# Patient Record
(demographics unavailable — no encounter records)

---

## 2025-06-18 NOTE — ASSESSMENT
[FreeTextEntry1] : Recent labs: CBC: Hb 12.0, WBC and plt wnl CMP: lytes wnl, Cr 0.8, LFTs wnl Iron studies: TIBC 300, Tsat 28, ferritin 70 TSH wnl A1c 5.4% hepatitis core ab reactive    RTC in 2 weeks for BP check and sx's check in

## 2025-06-18 NOTE — HISTORY OF PRESENT ILLNESS
[FreeTextEntry1] : Establish care. Feet  swollen  [de-identified] : 82M Yi speaking w/ PMHx HTN, BPH, and GERD who presents to establish care.   He c/o dizzy spells both with positional change and at rest. Sometimes when going from sitting to standing he feels like he is spinning. Had a mechanical fall a few nights ago when going to the bathroom, no head strike or LOC, no presyncope. Felt unsteady and tripped. Also feels very dizzy when laying down and turning his head.   Also c/o palpitations, lasting minuts and the self resolve. Usually occur at rest. No associated CP, presyncope, or dyspnea.   Reports some polyuria, with frequent nocturnal awakening. Small volume urination but no dribbling. No gross hematuria.  C/o LE sweeling, b/l persist morning to night - especially noticable when wearing socks.

## 2025-06-18 NOTE — END OF VISIT
[FreeTextEntry3] : Patient is an 82-year-old man here to establish care.  He has a history of BPH, hypertension and GERD.  He is complaining of vertigo which is positional and lightheadedness.  He also has palpitations and urinary frequency.  He urinates every hour or 2 throughout the day.  He takes tamsulosin daily.  His initial blood pressure was very close to goal upon repeat it was a bit elevated.  EKG shows sinus bradycardia with heart rate in the 40s and sinus arrhythmia.  Shay-Hallpike test is negative although patient had significant dizziness with movement labs obtained outside the organization are okay including thyroid function test.  Will refer to vestibular rehab, increase tamsulosin to 0.8 mg daily, refer to cardiology for evaluation of arrhythmia and ENT for vertigo.  EKG shows significant sinus bradycardia. We will order a sphygmomanometer for the patient and have him take his blood pressure at home twice daily with the assistance of his daughter.  Will see patient back here in 2 weeks.

## 2025-07-01 NOTE — ASSESSMENT
[FreeTextEntry1] : 83 y/o man with dizziness - sometimes positional, others with palpitations. Notable that he has shown sinus rates as low as 42, raising possibility of a tachy-renu syndrome.  Await results of Isa (M)

## 2025-07-01 NOTE — PHYSICAL EXAM

## 2025-07-01 NOTE — REASON FOR VISIT
[Hypertension] : hypertension [Other: ____] : [unfilled] [Family Member] : family member [FreeTextEntry3] : Renetta Gama [FreeTextEntry1] : Patient with h/o HTN c/o dizziness. Seems to be positional, mostly when he turns his head and he is scheduled to see otology end of this month.  He also c/o palpitations described as rapid beating with no apparent triggers, but this can also cause some dizziness. NO syncope.  Non-exertional.  EKG is NSR, rate 61, left axis.  Another EKG from June shows rate 42.

## 2025-07-09 NOTE — HEALTH RISK ASSESSMENT
[Little interest or pleasure doing things] : 1) Little interest or pleasure doing things [Feeling down, depressed, or hopeless] : 2) Feeling down, depressed, or hopeless [0] : 2) Feeling down, depressed, or hopeless: Not at all (0) [PHQ-9 Negative - No further assessment needed] : PHQ-9 Negative - No further assessment needed [Never] : Never [[id number]] : 0

## 2025-07-09 NOTE — HISTORY OF PRESENT ILLNESS
[FreeTextEntry1] : pt here for follow up- htn [de-identified] : 82M Malay speaking w/ PMHx HTN, BPH, and GERD who presents to follow up on HTN and dizziness. The patient states that he has been complaint with his medications. He brought a BP log today which shows multiple readings of SBP in the 90s. He states that his symptoms have not improved since last visit, however, denies any more falls. He saw cardiology who placed a Holter monitor which was removed today. The patient states that his dizziness is like the "room is spinning" exacerbated by sudden movement of the head and neck and also standing from a sitted/laying down position. The patient states that he drinks 2 cups of coffee (AM and PM) and 3-4 cups of 4-6 ounces water a day at times, however, still feels like his mouth is dry. He states that he noticed lower extremity edema. He also mentions that he urinates often. Denies any headache, chest pain, abdominal pain, blurry vision, LOC, recent falls.

## 2025-07-09 NOTE — HEALTH RISK ASSESSMENT
[Little interest or pleasure doing things] : 1) Little interest or pleasure doing things [Feeling down, depressed, or hopeless] : 2) Feeling down, depressed, or hopeless [0] : 2) Feeling down, depressed, or hopeless: Not at all (0) [PHQ-9 Negative - No further assessment needed] : PHQ-9 Negative - No further assessment needed [Never] : Never [[id number]] : 0

## 2025-07-09 NOTE — END OF VISIT
[] : Resident [FreeTextEntry3] : #HTN- started on hctz recentlyt . how are bps at home. stop hctz as bp too good  #Palpitations- pending heart montior result  #vertigo- was told had bppv and recommened vestibular rehab- still needs to go- negative orthostatic vitals but has orthostatic symptoms- trial compression stockings. had fall few months ago. get cbc, cmp

## 2025-07-17 NOTE — HEALTH RISK ASSESSMENT
[Little interest or pleasure doing things] : 1) Little interest or pleasure doing things [Feeling down, depressed, or hopeless] : 2) Feeling down, depressed, or hopeless [0] : 2) Feeling down, depressed, or hopeless: Not at all (0) [PHQ-9 Negative - No further assessment needed] : PHQ-9 Negative - No further assessment needed [Never] : Never [[id number]] : 0

## 2025-07-17 NOTE — HISTORY OF PRESENT ILLNESS
[FreeTextEntry1] : Pt. not feeling well, has been having dizzy spells for the last 6 month [de-identified] : 82M Turkish speaking w/ PMHx HTN, BPH, and GERD who presents for follow up exam. Presenting with 3-4 months of leg swelling. Has tried compressions socks, exercises daily, and elevates. States the swelling is worse with the stockings. Also reports 3-4 months of dizziness. States that the dizziness is throughout the day, worse when standing, and reports a room spinning sensation. Denies headaches nausea or difficulty walking. Also reports a rash, seen by dermatology received biopsy that revealed eczema, and has tried many medications for this without improvement, and is asking for a dermatology referral at this time. Denies any chest pain, shortness of breath

## 2025-07-17 NOTE — REVIEW OF SYSTEMS
[Fever] : no fever [Chills] : no chills [Chest Pain] : no chest pain [Palpitations] : no palpitations [Shortness Of Breath] : no shortness of breath [Wheezing] : no wheezing [Cough] : no cough [Abdominal Pain] : no abdominal pain [Nausea] : no nausea [Constipation] : no constipation [Diarrhea] : no diarrhea [Vomiting] : no vomiting

## 2025-07-25 NOTE — HISTORY OF PRESENT ILLNESS
[de-identified] : 82 y M presents today as a NPA referred by Dr. Chowdhury for dizzy spells for the past 2 months. Pt is accompanied by grand daughter. Pt states the 1st month was worst. Pt states of onset of dizzy spells occurs during normal activity but mostly when lying down in bed or walking. It lasts for a minute.  Pt denies any other symptoms such as headaches or ear pain. Pt states he does not take any medication for the dizziness.